# Patient Record
Sex: FEMALE | Race: AMERICAN INDIAN OR ALASKA NATIVE
[De-identification: names, ages, dates, MRNs, and addresses within clinical notes are randomized per-mention and may not be internally consistent; named-entity substitution may affect disease eponyms.]

---

## 2020-04-05 ENCOUNTER — HOSPITAL ENCOUNTER (EMERGENCY)
Dept: HOSPITAL 5 - ED | Age: 60
Discharge: HOME | End: 2020-04-05
Payer: MEDICARE

## 2020-04-05 VITALS — SYSTOLIC BLOOD PRESSURE: 160 MMHG | DIASTOLIC BLOOD PRESSURE: 85 MMHG

## 2020-04-05 DIAGNOSIS — R10.12: ICD-10-CM

## 2020-04-05 DIAGNOSIS — E11.9: ICD-10-CM

## 2020-04-05 DIAGNOSIS — R74.8: ICD-10-CM

## 2020-04-05 DIAGNOSIS — I10: ICD-10-CM

## 2020-04-05 DIAGNOSIS — E11.65: Primary | ICD-10-CM

## 2020-04-05 DIAGNOSIS — E83.42: ICD-10-CM

## 2020-04-05 LAB
ALBUMIN SERPL-MCNC: 4.1 G/DL (ref 3.9–5)
ALT SERPL-CCNC: 22 UNITS/L (ref 7–56)
BUN SERPL-MCNC: 10 MG/DL (ref 7–17)
BUN/CREAT SERPL: 14 %
CALCIUM SERPL-MCNC: 9.4 MG/DL (ref 8.4–10.2)
HCT VFR BLD CALC: 37.5 % (ref 30.3–42.9)
HEMOLYSIS INDEX: 6
HGB BLD-MCNC: 12.6 GM/DL (ref 10.1–14.3)
INR PPP: 0.84 (ref 0.87–1.13)
MCHC RBC AUTO-ENTMCNC: 34 % (ref 30–34)
MCV RBC AUTO: 92 FL (ref 79–97)
PLATELET # BLD: 243 K/MM3 (ref 140–440)
RBC # BLD AUTO: 4.06 M/MM3 (ref 3.65–5.03)

## 2020-04-05 PROCEDURE — 83690 ASSAY OF LIPASE: CPT

## 2020-04-05 PROCEDURE — 96365 THER/PROPH/DIAG IV INF INIT: CPT

## 2020-04-05 PROCEDURE — 99285 EMERGENCY DEPT VISIT HI MDM: CPT

## 2020-04-05 PROCEDURE — 93010 ELECTROCARDIOGRAM REPORT: CPT

## 2020-04-05 PROCEDURE — 82550 ASSAY OF CK (CPK): CPT

## 2020-04-05 PROCEDURE — 74177 CT ABD & PELVIS W/CONTRAST: CPT

## 2020-04-05 PROCEDURE — 96361 HYDRATE IV INFUSION ADD-ON: CPT

## 2020-04-05 PROCEDURE — 85610 PROTHROMBIN TIME: CPT

## 2020-04-05 PROCEDURE — 96375 TX/PRO/DX INJ NEW DRUG ADDON: CPT

## 2020-04-05 PROCEDURE — 83735 ASSAY OF MAGNESIUM: CPT

## 2020-04-05 PROCEDURE — 80053 COMPREHEN METABOLIC PANEL: CPT

## 2020-04-05 PROCEDURE — 85027 COMPLETE CBC AUTOMATED: CPT

## 2020-04-05 PROCEDURE — 93005 ELECTROCARDIOGRAM TRACING: CPT

## 2020-04-05 PROCEDURE — 82962 GLUCOSE BLOOD TEST: CPT

## 2020-04-05 PROCEDURE — 36415 COLL VENOUS BLD VENIPUNCTURE: CPT

## 2020-04-05 NOTE — EMERGENCY DEPARTMENT REPORT
ED General Adult HPI





- General


Chief complaint: Abdominal Pain


Stated complaint: ABD PAIN


Time Seen by Provider: 20 00:56


Source: patient, EMS ( EMS documentation not available at time of chart 

dictation ), RN notes reviewed


Mode of arrival: Stretcher


Limitations: Physical Limitation





- History of Present Illness


Initial comments: 





Gastroenterology: Dr. Pito Vásquez





The patient is a 59-year-old female who reports a history of diabetes, being 

blind in the left eye, and gastroparesis.  She has chronic left upper quadrant 

abdominal pain.  She presents to the ER today with an acute exacerbation of her 

left upper quadrant abdominal pain.  Her symptoms have started within the past 4

to 5 hours.  There are no DVT or pulmonary embolism risk factors.  No fever, no 

cough, no confirmed exposure to coronavirus.  There is no complaint of headache,

neck pain, chest pain, shortness of breath, urinary symptoms, there is no 

complaint of extremity weakness and or numbness.  This feels similar to prior 

episodes of gastroparesis flare.  Patient makes no complaint of nausea, vomiting

or diarrhea.


-: Gradual, hour(s)


Location: abdomen


Radiation: non-radiation


Consistency: constant


Improves with: none


Worsens with: movement





- Related Data


                                Home Medications











 Medication  Instructions  Recorded  Confirmed  Last Taken


 


AtorvaSTATin [Lipitor] 10 mg PO QHS 20 1 Day Ago





    ~20


 


Linagliptin [Tradjenta] 5 mg PO QDAY 20 1 Day Ago





    ~20


 


Metformin HCl [Metformin HCl ER] 500 mg PO BID 20 1 Day Ago





    ~20


 


cilostazoL 50 mg PO BID 20 1 Day Ago





    ~20


 


lisinopriL [Zestril TAB] 10 mg PO QHS 20 1 Day Ago





    ~20








                                  Previous Rx's











 Medication  Instructions  Recorded  Last Taken  Type


 


Dicyclomine [Bentyl] 20 mg PO QID #60 tablet 14 Unknown Rx


 


Acetaminophen [Non-Aspirin Extra 500 mg PO Q6HR PRN #30 tablet 20 Unknown 

Rx





Strength]    


 


Famotidine [Pepcid] 20 mg PO BID #30 tablet 20 Unknown Rx


 


Magnesium Oxide [Mag-Ox] 400 mg PO QDAY #7 tablet 20 Unknown Rx


 


Metoclopramide [Reglan] 10 mg PO QID PRN #30 tablet 20 Unknown Rx











                                    Allergies











Allergy/AdvReac Type Severity Reaction Status Date / Time


 


morphine AdvReac  Nausea Verified 06/25/15 13:35














ED Review of Systems


ROS: 


Stated complaint: ABD PAIN


Other details as noted in HPI





Eyes: denies: eye discharge


Respiratory: denies: wheezing


Cardiovascular: denies: syncope


Gastrointestinal: abdominal pain


Genitourinary: denies: dysuria


Musculoskeletal: as per HPI


Skin: as per HPI


Neurological: as per HPI


Psychiatric: as per HPI


Hematological/Lymphatic: as per HPI





ED Past Medical Hx





- Past Medical History


Previous Medical History?: Yes


Hx Hypertension: Yes


Hx CVA: No


Hx Heart Attack/AMI: No


Hx Congestive Heart Failure: No


Hx Diabetes: Yes


Hx Deep Vein Thrombosis: No


Hx Pulmonary Embolism: No


Hx Renal Disease: No


Hx Sickle Cell Disease: No


Hx Arthritis: No


Hx Seizures: No


Hx Kidney Stones: No


Hx Psychiatric Treatment: No


Hx Asthma: No


Hx COPD: No


Hx Tuberculosis: No


Hx Dementia: No


Hx HIV: No


Additional medical history: BLIND/VISUALLY IMPAIRED, Peripheral Artery Disease





- Surgical History


Past Surgical History?: Yes


Hx Coronary Stent: No


Hx Open Heart Surgery: No


Hx Pacemaker: No


Hx Internal Defibrillator: No


Hx Cholecystectomy: Yes


Hx Appendectomy: No


Hx Breast Surgery: No


Additional Surgical History: r ankle sx.  c section x 2.  EYE SURGERY





- Social History


Smoking Status: Never Smoker





- Medications


Home Medications: 


                                Home Medications











 Medication  Instructions  Recorded  Confirmed  Last Taken  Type


 


Dicyclomine [Bentyl] 20 mg PO QID #60 tablet 14 Unknown Rx


 


Acetaminophen [Non-Aspirin Extra 500 mg PO Q6HR PRN #30 tablet 20  Unknown

 Rx





Strength]     


 


AtorvaSTATin [Lipitor] 10 mg PO QHS 20 1 Day Ago History





    ~20 


 


Famotidine [Pepcid] 20 mg PO BID #30 tablet 20  Unknown Rx


 


Linagliptin [Tradjenta] 5 mg PO QDAY 20 1 Day Ago History





    ~20 


 


Magnesium Oxide [Mag-Ox] 400 mg PO QDAY #7 tablet 04/05/20  Unknown Rx


 


Metformin HCl [Metformin HCl ER] 500 mg PO BID 20 1 Day Ago 

History





    ~20 


 


Metoclopramide [Reglan] 10 mg PO QID PRN #30 tablet 20  Unknown Rx


 


cilostazoL 50 mg PO BID 20 1 Day Ago History





    ~20 


 


lisinopriL [Zestril TAB] 10 mg PO QHS 20 1 Day Ago History





    ~20 














ED Physical Exam





- General


Limitations: No Limitations


General appearance: alert, anxious





- Head


Head exam: Present: atraumatic, normocephalic





- Eye


Eye exam: Present: normal appearance (Right eye appears to be within normal 

limits.  Left eye has a cloudy opacified cornea, which is chronic as per the 

patient).  Absent: nystagmus





- ENT


ENT exam: Present: normal exam, normal orophraynx, mucous membranes moist





- Neck


Neck exam: Present: normal inspection, full ROM.  Absent: tenderness, 

meningismus





- Respiratory


Respiratory exam: Present: normal lung sounds bilaterally.  Absent: respiratory 

distress





- Cardiovascular


Cardiovascular Exam: Present: regular rate, normal rhythm, normal heart sounds. 

Absent: bradycardia, tachycardia, irregular rhythm, systolic murmur, diastolic 

murmur, rubs, gallop





- GI/Abdominal


GI/Abdominal exam: Present: soft, tenderness, other (There is left upper 

quadrant tenderness, without rebound, guarding or peritoneal sign).  Absent: 

distended, guarding, rebound, rigid, pulsatile mass





- Extremities Exam


Extremities exam: Present: normal inspection, full ROM, other (2+ pulses noted 

in the bilateral upper and lower extremities.  There is no palpable cord.   nega

tive Homans sign.  Muscular compartments are soft.  The pelvis is stable.).  

Absent: pedal edema, calf tenderness





- Back Exam


Back exam: Present: normal inspection, full ROM.  Absent: tenderness, CVA 

tenderness (R), CVA tenderness (L), paraspinal tenderness, vertebral tenderness





- Neurological Exam


Neurological exam: Present: alert, other (There is no facial droop.  The tongue 

is midline.  Extraocular movements are intact bilaterally.  There is 5 out of 5 

strength in bilateral upper and lower extremities.  Sensation is intact to light

touch bilateral upper and lower extremities. )





- Psychiatric


Psychiatric exam: Present: anxious





- Skin


Skin exam: Present: warm, dry, intact, normal color.  Absent: rash





ED Course


                                   Vital Signs











  20





  01:03 01:30 01:45


 


Temperature 98.9 F  


 


Pulse Rate  85 88


 


Respiratory  11 L 12





Rate   


 


Blood Pressure   155/92


 


O2 Sat by Pulse   100





Oximetry   














  20





  02:00 02:15 02:30


 


Temperature   


 


Pulse Rate 84 81 83


 


Respiratory 9 L 17 19





Rate   


 


Blood Pressure 180/96 166/93 176/94


 


O2 Sat by Pulse 100 100 100





Oximetry   














  20





  02:45 03:10 03:15


 


Temperature   


 


Pulse Rate 84 85 81


 


Respiratory 13  9 L





Rate   


 


Blood Pressure 175/88 180/91 161/86


 


O2 Sat by Pulse 100  100





Oximetry   














  20





  03:30


 


Temperature 


 


Pulse Rate 76


 


Respiratory 12





Rate 


 


Blood Pressure 160/85


 


O2 Sat by Pulse 100





Oximetry 














- Reevaluation(s)


Reevaluation #1: 





20 02:18


Differential diagnosis, including but not limited to: Gastroparesis, pneumonia, 

urinary tract infection, obstruction, colitis, diverticulitis, perforated viscus








Assessment and plan: 59-year-old female with reported history of gastroparesis, 

not currently tachycardic, tachypneic or hypoxic, who endorses no DVT or 

pulmonary embolism risk factors, presenting with acute on chronic exacerbation 

of left upper quadrant abdominal pain.  She is afebrile with reassuring vital 

signs with exception of elevated blood pressure.  Laboratory studies reviewed 

and appreciated.  She will be given fluids, hydromorphone, insulin, magnesium 

sulfate, CT scan abdomen pelvis is ordered, urinalysis is pending at this time, 

I have requested that nursing team reconcile medications.


Reevaluation #2: 





20 03:48


Hyperglycemia is improved.  CT scan abdomen pelvis negative for acute disease.  

Laboratory studies reviewed and appreciated, patient is found to have elevated 

lipase.  This may be early pancreatitis.  However, her pain is improved, CT scan

negative for acute findings, she is tolerating liquid feeds, she is suitable for

trial of outpatient management.  Furthermore, her gastroenterology group is 

typically quite able and willing to accommodate outpatient appointments for 

follow-up.  Patient will be discharged with prescription for pain medication, 

nausea medication, instructions to follow-up with her outpatient 

gastroenterologist.  Return precautions are reviewed.





ED Medical Decision Making





- Lab Data


Result diagrams: 


                                 20 01:26





                                 20 01:26








                                   Vital Signs











  20





  01:03 01:30 01:45


 


Temperature 98.9 F  


 


Pulse Rate  85 88


 


Respiratory  11 L 12





Rate   


 


Blood Pressure   155/92


 


O2 Sat by Pulse   100





Oximetry   














  20





  02:00


 


Temperature 


 


Pulse Rate 84


 


Respiratory 9 L





Rate 


 


Blood Pressure 180/96


 


O2 Sat by Pulse 100





Oximetry 











                                   Lab Results











  20 Range/Units





  01:26 01:26 


 


WBC  6.6   (4.5-11.0)  K/mm3


 


RBC  4.06   (3.65-5.03)  M/mm3


 


Hgb  12.6   (10.1-14.3)  gm/dl


 


Hct  37.5   (30.3-42.9)  %


 


MCV  92   (79-97)  fl


 


MCH  31   (28-32)  pg


 


MCHC  34   (30-34)  %


 


RDW  12.7 L   (13.2-15.2)  %


 


Plt Count  243   (140-440)  K/mm3


 


Sodium   138  (137-145)  mmol/L


 


Potassium   3.9  (3.6-5.0)  mmol/L


 


Chloride   102.2  ()  mmol/L


 


Carbon Dioxide   23  (22-30)  mmol/L


 


Anion Gap   17  mmol/L


 


BUN   10  (7-17)  mg/dL


 


Creatinine   0.7  (0.7-1.2)  mg/dL


 


Estimated GFR   > 60  ml/min


 


BUN/Creatinine Ratio   14  %


 


Glucose   357 H  ()  mg/dL


 


Calcium   9.4  (8.4-10.2)  mg/dL


 


Magnesium   1.50 L  (1.7-2.3)  mg/dL


 


Total Bilirubin   0.30  (0.1-1.2)  mg/dL


 


AST   17  (5-40)  units/L


 


ALT   22  (7-56)  units/L


 


Alkaline Phosphatase   82  ()  units/L


 


Total Protein   7.0  (6.3-8.2)  g/dL


 


Albumin   4.1  (3.9-5)  g/dL


 


Albumin/Globulin Ratio   1.4  %














- EKG Data


-: EKG Interpreted by Me


EKG shows normal: sinus rhythm


Rate: normal





- EKG Data





20 02:18


Sinus rhythm, 91 bpm, normal axis,  ms, incomplete/poor R wave 

progression, motion artifact.  The EKG is abnormal, it is not a STEMI, it 

appears to be unchanged from prior EKG from 2015





- Radiology Data


Radiology results: pending, report reviewed, image reviewed





Print Report











Referring Physician: RENAY CLAROS 


 


Patient Name: LUIS CALDERA 


 


Patient ID: [id number] 


 


YOB: 1960 


 


Sex: Female 


 


Accession: [id number] 


 


Report Date: 2020 


 


Report Status: Finalized 


 


Findings


[facility] [address] [place] [id number] Cat

Scan Report Signed Patient: LUIS CALDERA MR#: [id number] : 1960

Acct:[id number] Age/Sex: 59 / F ADM Date: 20 Loc: ED Attending Dr: 

Ordering Physician: RENAY CLAROS MD Date of Service: 20 Procedure(s):

CT abdomen pelvis w con Accession Number(s): [id number] cc: RENAY CLAROS MD CT

abdomen pelvis w con INDICATION / CLINICAL INFORMATION: MAIN: luq abd pain, ttp,

hx of "gastroparesis" 100cc of omnipaque 300 . TECHNIQUE: All CT scans at this 

location are performed using CT dose reduction for ALARA by means of automated 

exposure control. COMPARISON: None available. FINDINGS: Limited lower thoracic 

images are negative. ABDOMEN: Stomach is not significantly distended. 

Cholecystectomy. The liver, spleen, pancreas, kidneys and adrenal glands are 

normal. No small bowel dilatation. Atherosclerotic changes are seen in the 

aortoiliac vessels without aneurysm. No mesenteric or retroperitoneal 

adenopathy. Pelvis: No dependent fluid collections or acute inflammatory changes

are identified in the pelvis. There is a mild uterine enlargement with several 

fibroids noted. Healed pelvic fractures. 








IMPRESSION: 1. No acute abdominal or pelvic abnormalities. Signer Name: Rodolfo Epstein MD Signed: 2020 3:26 AM Workstation Name: [id number] Transcribed By:

GA Dictated By: Rodolfo Epstein MD Electronically Authenticated By: Rodolfo Epstein MD Signed Date/Time: 20 0326   











Critical care attestation.: 


If time is entered above; I have spent that time in minutes in the direct care 

of this critically ill patient, excluding procedure time.








ED Disposition


Clinical Impression: 


 Hyperglycemia, Elevated lipase, Left upper quadrant abdominal pain, 

Hypomagnesemia





Disposition: DC-01 TO HOME OR SELFCARE


Is pt being admited?: No


Does the pt Need Aspirin: No


Condition: Stable


Instructions:  Pancreatitis (ED)


Additional Instructions: 


Continue outpatient medications, with the exception of metformin; the patient 

should not take this medication for the next 48 hours.  Please drink 4 to 6 cups

of water per day, avoid consumption of Motrin, ibuprofen, Naprosyn, Aleve, heavy

and/or spicy foods, avoid consumption of alcohol, sugary drinks.





Take the pain medication, nausea medication as needed and/or directed.  Please 

follow-up with your outpatient primary care doctor or gastroenterologist within 

the next 3 to 5 days for repeat checkup and/or evaluation.





Please return to the emergency room right away with new pain, worsened pain, 

migration of pain, projectile vomiting, change in mental status, confusion, 

inability to tolerate liquid feeds, new, worsened or different symptoms not 

present on the initial emergency room evaluation.


Referrals: 


PITO VÁSQUEZ MD [Staff Physician] - 3-5 Days

## 2020-04-05 NOTE — CAT SCAN REPORT
CT abdomen pelvis w con 



INDICATION / CLINICAL INFORMATION:

MAIN: luq abd pain, ttp, hx of "gastroparesis"   100cc of omnipaque 300 .



TECHNIQUE:

All CT scans at this location are performed using CT dose reduction for ALARA by means of automated e
xposure control. 



COMPARISON:

None available.



FINDINGS:

Limited lower thoracic images are negative.



ABDOMEN:

Stomach is not significantly distended.

Cholecystectomy.

The liver, spleen, pancreas, kidneys and adrenal glands are normal.

No small bowel dilatation.



Atherosclerotic changes are seen in the aortoiliac vessels without aneurysm.

No mesenteric or retroperitoneal adenopathy.



Pelvis:

No dependent fluid collections or acute inflammatory changes are identified in the pelvis.

There is a mild uterine enlargement with several fibroids noted.



Healed pelvic fractures.



IMPRESSION:

1. No acute abdominal or pelvic abnormalities. 



Signer Name: Rodolfo Epstein MD 

Signed: 4/5/2020 3:26 AM

Workstation Name: [id number]

## 2024-03-27 ENCOUNTER — LAB (OUTPATIENT)
Dept: URBAN - METROPOLITAN AREA CLINIC 109 | Facility: CLINIC | Age: 64
End: 2024-03-27

## 2024-03-27 ENCOUNTER — OV NP (OUTPATIENT)
Dept: URBAN - METROPOLITAN AREA CLINIC 109 | Facility: CLINIC | Age: 64
End: 2024-03-27
Payer: MEDICARE

## 2024-03-27 VITALS
HEIGHT: 66 IN | HEART RATE: 85 BPM | DIASTOLIC BLOOD PRESSURE: 79 MMHG | TEMPERATURE: 97 F | SYSTOLIC BLOOD PRESSURE: 143 MMHG | BODY MASS INDEX: 22.56 KG/M2 | WEIGHT: 140.4 LBS

## 2024-03-27 DIAGNOSIS — R14.0 ABDOMINAL BLOATING: ICD-10-CM

## 2024-03-27 DIAGNOSIS — R19.4 CHANGE IN BOWEL HABITS: ICD-10-CM

## 2024-03-27 DIAGNOSIS — Z12.11 SCREENING FOR COLORECTAL CANCER: ICD-10-CM

## 2024-03-27 DIAGNOSIS — K31.84 GASTROPARESIS: ICD-10-CM

## 2024-03-27 PROBLEM — 235675006: Status: ACTIVE | Noted: 2024-03-27

## 2024-03-27 PROCEDURE — 99204 OFFICE O/P NEW MOD 45 MIN: CPT | Performed by: INTERNAL MEDICINE

## 2024-03-27 RX ORDER — FAMOTIDINE 20 MG/1
TABLET, FILM COATED ORAL
Qty: 180 TABLET | Status: ACTIVE | COMMUNITY

## 2024-03-27 RX ORDER — ATORVASTATIN CALCIUM 10 MG/1
TABLET, FILM COATED ORAL
Qty: 0 | Refills: 0 | Status: ACTIVE | COMMUNITY
Start: 1900-01-01

## 2024-03-27 RX ORDER — POLYETHYLENE GLYCOL 3350, SODIUM SULFATE ANHYDROUS, SODIUM BICARBONATE, SODIUM CHLORIDE, POTASSIUM CHLORIDE 236; 22.74; 6.74; 5.86; 2.97 G/4L; G/4L; G/4L; G/4L; G/4L
4000 ML POWDER, FOR SOLUTION ORAL ONCE
Qty: 1 | Refills: 0 | OUTPATIENT
Start: 2024-03-27 | End: 2024-03-28

## 2024-03-27 RX ORDER — METFORMIN HYDROCHLORIDE 500 MG/1
TABLET, COATED ORAL
Qty: 0 | Refills: 0 | Status: ACTIVE | COMMUNITY
Start: 1900-01-01

## 2024-03-27 RX ORDER — LISINOPRIL 20 MG/1
TABLET ORAL
Qty: 90 TABLET | Status: ACTIVE | COMMUNITY

## 2024-03-27 RX ORDER — DAPAGLIFLOZIN 10 MG/1
TABLET, FILM COATED ORAL
Qty: 30 TABLET | Status: ACTIVE | COMMUNITY

## 2024-03-27 RX ORDER — CILOSTAZOL 50 MG/1
TABLET ORAL
Qty: 180 TABLET | Status: ACTIVE | COMMUNITY

## 2024-03-27 RX ORDER — LINAGLIPTIN 5 MG/1
TAKE 1 TABLET (5 MG) BY ORAL ROUTE ONCE DAILY TABLET, FILM COATED ORAL 1
Qty: 0 | Refills: 0 | Status: ON HOLD | COMMUNITY
Start: 1900-01-01

## 2024-03-27 RX ORDER — BISACODYL 5 MG
3 TABLET, DELAYED RELEASE (ENTERIC COATED) ORAL
Qty: 3 | Refills: 0 | OUTPATIENT
Start: 2024-03-27 | End: 2024-03-28

## 2024-03-27 NOTE — HPI-TODAY'S VISIT:
The patient presents for colonoscopy evaluation.  h/o IDDM, complicated by retinopathy with blindness, and has had gastroparesis in the past.  previously with frequent n/v episodes and abd discomfort/bloating.  this has improved since being on pre-biotic.  denies recent n/v episodes.  alternates b/w constipation and diarrhea.  not aware of overt gi bleeding but pt unsure given vision loss.  last colonoscopy 10 years ago.  denies cp/sob or h/o cad/chf.

## 2024-04-25 ENCOUNTER — COLON (OUTPATIENT)
Dept: URBAN - METROPOLITAN AREA SURGERY CENTER 23 | Facility: SURGERY CENTER | Age: 64
End: 2024-04-25

## 2024-04-25 ENCOUNTER — LAB (OUTPATIENT)
Dept: URBAN - METROPOLITAN AREA CLINIC 4 | Facility: CLINIC | Age: 64
End: 2024-04-25
Payer: COMMERCIAL

## 2024-04-25 DIAGNOSIS — D12.2 BENIGN NEOPLASM OF ASCENDING COLON: ICD-10-CM

## 2024-04-25 PROCEDURE — 88305 TISSUE EXAM BY PATHOLOGIST: CPT | Performed by: PATHOLOGY

## 2024-04-25 RX ORDER — CILOSTAZOL 50 MG/1
TABLET ORAL
Qty: 180 TABLET | Status: ACTIVE | COMMUNITY

## 2024-04-25 RX ORDER — DAPAGLIFLOZIN 10 MG/1
TABLET, FILM COATED ORAL
Qty: 30 TABLET | Status: ACTIVE | COMMUNITY

## 2024-04-25 RX ORDER — METFORMIN HYDROCHLORIDE 500 MG/1
TABLET, COATED ORAL
Qty: 0 | Refills: 0 | Status: ACTIVE | COMMUNITY
Start: 1900-01-01

## 2024-04-25 RX ORDER — LINAGLIPTIN 5 MG/1
TAKE 1 TABLET (5 MG) BY ORAL ROUTE ONCE DAILY TABLET, FILM COATED ORAL 1
Qty: 0 | Refills: 0 | Status: ON HOLD | COMMUNITY
Start: 1900-01-01

## 2024-04-25 RX ORDER — ATORVASTATIN CALCIUM 10 MG/1
TABLET, FILM COATED ORAL
Qty: 0 | Refills: 0 | Status: ACTIVE | COMMUNITY
Start: 1900-01-01

## 2024-04-25 RX ORDER — LISINOPRIL 20 MG/1
TABLET ORAL
Qty: 90 TABLET | Status: ACTIVE | COMMUNITY

## 2024-04-25 RX ORDER — FAMOTIDINE 20 MG/1
TABLET, FILM COATED ORAL
Qty: 180 TABLET | Status: ACTIVE | COMMUNITY

## 2025-04-01 ENCOUNTER — TELEPHONE ENCOUNTER (OUTPATIENT)
Dept: URBAN - METROPOLITAN AREA CLINIC 95 | Facility: CLINIC | Age: 65
End: 2025-04-01

## 2025-04-25 ENCOUNTER — DASHBOARD ENCOUNTERS (OUTPATIENT)
Age: 65
End: 2025-04-25

## 2025-05-21 ENCOUNTER — OFFICE VISIT (OUTPATIENT)
Dept: URBAN - METROPOLITAN AREA CLINIC 109 | Facility: CLINIC | Age: 65
End: 2025-05-21

## 2025-05-21 RX ORDER — LISINOPRIL 20 MG/1
TABLET ORAL
Qty: 90 TABLET | COMMUNITY

## 2025-05-21 RX ORDER — CILOSTAZOL 50 MG/1
TABLET ORAL
Qty: 180 TABLET | COMMUNITY

## 2025-05-21 RX ORDER — ATORVASTATIN CALCIUM 10 MG/1
TABLET, FILM COATED ORAL
Qty: 0 | Refills: 0 | COMMUNITY
Start: 1900-01-01

## 2025-05-21 RX ORDER — LINAGLIPTIN 5 MG/1
TAKE 1 TABLET (5 MG) BY ORAL ROUTE ONCE DAILY TABLET, FILM COATED ORAL 1
Qty: 0 | Refills: 0 | COMMUNITY
Start: 1900-01-01

## 2025-05-21 RX ORDER — FAMOTIDINE 20 MG/1
TABLET, FILM COATED ORAL
Qty: 180 TABLET | COMMUNITY

## 2025-05-21 RX ORDER — DAPAGLIFLOZIN 10 MG/1
TABLET, FILM COATED ORAL
Qty: 30 TABLET | COMMUNITY

## 2025-05-21 RX ORDER — METFORMIN HYDROCHLORIDE 500 MG/1
TABLET, COATED ORAL
Qty: 0 | Refills: 0 | COMMUNITY
Start: 1900-01-01